# Patient Record
Sex: FEMALE | URBAN - METROPOLITAN AREA
[De-identification: names, ages, dates, MRNs, and addresses within clinical notes are randomized per-mention and may not be internally consistent; named-entity substitution may affect disease eponyms.]

---

## 2021-10-29 ENCOUNTER — OFFICE VISIT (OUTPATIENT)
Dept: URBAN - METROPOLITAN AREA CLINIC 88 | Facility: CLINIC | Age: 51
End: 2021-10-29

## 2021-11-01 ENCOUNTER — TELEPHONE ENCOUNTER (OUTPATIENT)
Dept: URBAN - METROPOLITAN AREA CLINIC 88 | Facility: CLINIC | Age: 51
End: 2021-11-01

## 2025-03-10 ENCOUNTER — OFFICE VISIT (OUTPATIENT)
Age: 55
End: 2025-03-10

## 2025-03-10 VITALS
HEIGHT: 65 IN | WEIGHT: 220.2 LBS | TEMPERATURE: 98.4 F | DIASTOLIC BLOOD PRESSURE: 84 MMHG | SYSTOLIC BLOOD PRESSURE: 126 MMHG | BODY MASS INDEX: 36.69 KG/M2 | HEART RATE: 73 BPM | OXYGEN SATURATION: 98 %

## 2025-03-10 DIAGNOSIS — R05.1 ACUTE COUGH: ICD-10-CM

## 2025-03-10 DIAGNOSIS — J06.9 VIRAL URI WITH COUGH: Primary | ICD-10-CM

## 2025-03-10 LAB
INFLUENZA VIRUS A RNA: NEGATIVE
INFLUENZA VIRUS B RNA: NEGATIVE
Lab: NORMAL
PERFORMING INSTRUMENT: NORMAL
QC PASS/FAIL: NORMAL
SARS-COV-2, POC: NORMAL

## 2025-03-10 RX ORDER — BENZONATATE 200 MG/1
200 CAPSULE ORAL 3 TIMES DAILY PRN
Qty: 30 CAPSULE | Refills: 0 | Status: SHIPPED | OUTPATIENT
Start: 2025-03-10 | End: 2025-03-20

## 2025-03-10 RX ORDER — DEXTROMETHORPHAN HYDROBROMIDE AND PROMETHAZINE HYDROCHLORIDE 15; 6.25 MG/5ML; MG/5ML
5 SYRUP ORAL NIGHTLY
Qty: 35 ML | Refills: 0 | Status: SHIPPED | OUTPATIENT
Start: 2025-03-10 | End: 2025-03-17

## 2025-03-10 RX ORDER — METHYLPREDNISOLONE 4 MG/1
TABLET ORAL
Qty: 1 KIT | Refills: 0 | Status: SHIPPED | OUTPATIENT
Start: 2025-03-10 | End: 2025-03-16

## 2025-03-10 ASSESSMENT — ENCOUNTER SYMPTOMS
SINUS PAIN: 1
SINUS PRESSURE: 1
COUGH: 1
SHORTNESS OF BREATH: 1
SORE THROAT: 1

## 2025-03-10 NOTE — PROGRESS NOTES
Varsha Pinedo (: 1970) is a 54 y.o. female, New patient, here for evaluation of the following chief complaint(s):  Cold Symptoms (PT. C/O FLU-LIKE SYMPTOMS CHEST TIGHTNESS AND CHEST PAINS,  PRODUCTIVE COUGH, SNEEZING X 3 DAYS.   )      Visit date not found    ASSESSMENT/PLAN:    ICD-10-CM    1. Viral URI with cough  J06.9       2. Acute cough  R05.1 POCT Influenza A/B DNA ([facility] i)     XR CHEST STANDARD (2 VW)     POCT COVID-19, Antigen     CANCELED: POCT COVID-19, Antigen     CANCELED: POCT Influenza A/B DNA ([facility] i)          COVID negative  Flu negative  Symptoms include body aches, congestion, cough, headaches, sore throat, with exam findings of congestion, rhonchi, there is concern Viral URI  Low concern for bacterial etiology of symptoms given lack of purulent findings and current course of illness less than 10 days, respiratory distress, community acquired pneumonia, PE otitis media, strep pharyngitis,    Because of the rhonchi noted on exam a chest x-ray was performed.  The x-ray showed no acute cardiopulmonary process but did show bandlike atelectasis in the right base.  The patient was called and notified of the x-ray result and told to continue recommendations given by the provider.  Recommended:  OTC Pseudoephedrine, Flonase, Zyrtec, and Saline nasal spray for congestion relief, advised to avoid with hx HTN or cardiac issues  Mucinex DM for cough with expectorant relief or plain DM if needed, advised to not use this during the night or with other DM products  Acetaminophen (Tylenol) and/or ibuprofen (Motrin, Advil) for aches/pains as needed, provided there are no contraindications  [place] diet, increased fluids, ginger candies for nausea/vomiting relief  Sipping on warm beverages (tea with honey), ice cream, popsicles, sore throat lozenges, Chloraseptic spray, warm salt water gargles for sore throat relief  Prescribed:   Benzonatate prescribed for daytime cough relief  Promethazine-DM

## 2025-03-10 NOTE — PATIENT INSTRUCTIONS
COVID negative; Flu negative;  Take medications as directed.  Benzonatate was prescribed for daytime cough relief. This is a non-drowsy medication.  Promethazine DM was prescribed for nighttime cough relief. Do not take other cough medications while on this medication. This medication will make you drowsy.  For congestion and runny nose, I recommend Pseudoephedrine, Flonase (or other steroid nasal sprays), Zyrtec (or other antihistamines), Vicks vapor rub, and saline nasal spray. Get the pseudoephedrine from BEHIND the pharmacy counter. It does not need a prescription. Avoid this if you have a history of high blood pressure or heart conditions. Do not take other decongestants while on this medication.  For cough, I recommend  Jordan's Vapor Rub.  You can also use cough drops, honey, and throat lozenges. I recommend 1-2 teaspoons of honey every hour for relief of throat irritation and coughing fits.  For sore throat, you can use throat sprays (Chloraseptic, Cepacol), sipping on warm beverages (tea with honey), ice cream, popsicles, sore throat lozenges, and warm salt water gargles.  For fever, aches/pains, you can take ibuprofen (Advil, Motrin) and acetaminophen (Tylenol) for fevers, aches, and pains, if needed. Do not take this if you have been told to avoid these medications.  For ear pain, I recommend warm compresses over the symptomatic ear(s) for 10-15 minutes, or a hot shower, followed by 1-2 minutes of massaging the area behind your ears and down the jaw-line to help with the ear congestion/ear pressure  Increase your fluid intake and get lots of rest.  Warm teas, humidifiers, nasal lavages, and sleeping in an inclined position are also helpful options that can lessen symptoms.  [place] diet (bananas, rice, applesauce and toast). Avoid fatty foods, carbonated beverages, chocolate, caffeine, mints, alcohol, citrus fruits/juices, oils, spicy foods, and acidic foods (such as tomato based foods/sauces). If diarrhea

## 2025-03-11 ENCOUNTER — RESULTS FOLLOW-UP (OUTPATIENT)
Age: 55
End: 2025-03-11